# Patient Record
Sex: FEMALE | Race: BLACK OR AFRICAN AMERICAN | NOT HISPANIC OR LATINO | Employment: UNEMPLOYED | URBAN - METROPOLITAN AREA
[De-identification: names, ages, dates, MRNs, and addresses within clinical notes are randomized per-mention and may not be internally consistent; named-entity substitution may affect disease eponyms.]

---

## 2019-01-13 ENCOUNTER — HOSPITAL ENCOUNTER (EMERGENCY)
Facility: HOSPITAL | Age: 29
Discharge: HOME | End: 2019-01-13
Attending: FAMILY MEDICINE
Payer: MEDICAID

## 2019-01-13 VITALS
DIASTOLIC BLOOD PRESSURE: 101 MMHG | RESPIRATION RATE: 18 BRPM | HEART RATE: 82 BPM | BODY MASS INDEX: 26.16 KG/M2 | HEIGHT: 65 IN | OXYGEN SATURATION: 99 % | TEMPERATURE: 98 F | WEIGHT: 157 LBS | SYSTOLIC BLOOD PRESSURE: 152 MMHG

## 2019-01-13 DIAGNOSIS — H92.01 RIGHT EAR PAIN: Primary | ICD-10-CM

## 2019-01-13 DIAGNOSIS — J02.9 PHARYNGITIS, UNSPECIFIED ETIOLOGY: ICD-10-CM

## 2019-01-13 LAB — DEPRECATED S PYO AG THROAT QL EIA: NEGATIVE

## 2019-01-13 PROCEDURE — 25000003 PHARM REV CODE 250: Mod: ER | Performed by: FAMILY MEDICINE

## 2019-01-13 PROCEDURE — 87081 CULTURE SCREEN ONLY: CPT | Mod: ER

## 2019-01-13 PROCEDURE — 87147 CULTURE TYPE IMMUNOLOGIC: CPT | Mod: ER

## 2019-01-13 PROCEDURE — 99284 EMERGENCY DEPT VISIT MOD MDM: CPT | Mod: ER

## 2019-01-13 PROCEDURE — 87880 STREP A ASSAY W/OPTIC: CPT | Mod: ER

## 2019-01-13 RX ORDER — IBUPROFEN 400 MG/1
800 TABLET ORAL
Status: COMPLETED | OUTPATIENT
Start: 2019-01-13 | End: 2019-01-13

## 2019-01-13 RX ORDER — AMOXICILLIN 250 MG/1
500 CAPSULE ORAL
Status: COMPLETED | OUTPATIENT
Start: 2019-01-13 | End: 2019-01-13

## 2019-01-13 RX ORDER — ACETAMINOPHEN 325 MG/1
325 TABLET ORAL EVERY 6 HOURS PRN
COMMUNITY
End: 2019-04-24

## 2019-01-13 RX ORDER — AMOXICILLIN 500 MG/1
500 CAPSULE ORAL 3 TIMES DAILY
Qty: 21 CAPSULE | Refills: 0 | Status: SHIPPED | OUTPATIENT
Start: 2019-01-13 | End: 2019-01-20

## 2019-01-13 RX ORDER — DICLOFENAC SODIUM 50 MG/1
50 TABLET, DELAYED RELEASE ORAL 2 TIMES DAILY
Qty: 20 TABLET | Refills: 0 | Status: SHIPPED | OUTPATIENT
Start: 2019-01-13 | End: 2019-04-24

## 2019-01-13 RX ADMIN — IBUPROFEN 800 MG: 400 TABLET ORAL at 01:01

## 2019-01-13 RX ADMIN — AMOXICILLIN 500 MG: 250 CAPSULE ORAL at 01:01

## 2019-01-13 NOTE — ED PROVIDER NOTES
Encounter Date: 1/13/2019       History     Chief Complaint   Patient presents with    Otalgia     x 3 days. Started with swollen on right side now extending into right ear.      28-year-old female complains sore throat and right side of the neck pain for last 2 days.  Which slowly progressed to her right here.  Patient has been doing home remedy without any much relief so came to the ER for evaluation.  Denies fever.  No shortness of breath. Mild odynophagia but no dysphagia.  No drainage from the ER.      The history is provided by the patient.     Review of patient's allergies indicates:  No Known Allergies  Past Medical History:   Diagnosis Date    Broken leg     Kidney infection      Past Surgical History:   Procedure Laterality Date    LEG SURGERY       History reviewed. No pertinent family history.  Social History     Tobacco Use    Smoking status: Never Smoker   Substance Use Topics    Alcohol use: No     Frequency: Never    Drug use: Not on file     Review of Systems   Constitutional: Negative for appetite change, chills and fever.   HENT: Positive for ear pain and sore throat. Negative for congestion, ear discharge, facial swelling, rhinorrhea, sinus pressure and sinus pain.    Eyes: Negative for pain, redness and itching.   Respiratory: Negative for shortness of breath and wheezing.    Cardiovascular: Negative for chest pain.   Gastrointestinal: Negative for abdominal pain, diarrhea, nausea and vomiting.   Genitourinary: Negative for dysuria, flank pain and frequency.   Musculoskeletal: Negative for neck pain and neck stiffness.   Neurological: Negative for dizziness, light-headedness, numbness and headaches.   Psychiatric/Behavioral: Negative for confusion, dysphoric mood and hallucinations. The patient is not nervous/anxious.    All other systems reviewed and are negative.      Physical Exam     Initial Vitals [01/13/19 0127]   BP Pulse Resp Temp SpO2   (!) 152/101 82 18 97.8 °F (36.6 °C) 99 %       MAP       --         Physical Exam    Nursing note and vitals reviewed.  Constitutional: She appears well-developed and well-nourished.   HENT:   Head: Normocephalic.   Right Ear: There is tenderness. Tympanic membrane is injected.   Left Ear: Tympanic membrane, external ear and ear canal normal. No tenderness. Tympanic membrane is not injected.   Nose: Nose normal.   Mouth/Throat: Mucous membranes are normal. Posterior oropharyngeal erythema present.   Eyes: Conjunctivae and EOM are normal. Pupils are equal, round, and reactive to light.   Neck: Normal range of motion. Neck supple.   Cardiovascular: Normal rate, regular rhythm, normal heart sounds and intact distal pulses.   Pulmonary/Chest: Breath sounds normal. No respiratory distress. She has no wheezes. She has no rhonchi. She has no rales. She exhibits no tenderness.   Abdominal: Soft. Bowel sounds are normal. She exhibits no distension. There is no tenderness.   Musculoskeletal: Normal range of motion.   Neurological: She is alert and oriented to person, place, and time. She has normal strength and normal reflexes. She displays normal reflexes. No cranial nerve deficit. GCS score is 15. GCS eye subscore is 4. GCS verbal subscore is 5. GCS motor subscore is 6.   Skin: Skin is warm. Capillary refill takes less than 2 seconds. No rash noted. No erythema.         ED Course   Procedures  Labs Reviewed   THROAT SCREEN, RAPID          Imaging Results    None          Medical Decision Making:   Initial Assessment:   28-year-old female complains of right pharyngeal pain radiating to her right ear.  Differential Diagnosis:   Otitis, pharyngitis, tonsillitis,  Clinical Tests:   Lab Tests: Ordered and Reviewed  ED Management:  Mild erythema on the right side of the pharynx along with mild erythema the right tympanic membrane.  Right-sided cervical tenderness but no lymph nodes. Patient is started on amoxicillin and ibuprofen.  Advised to follow up primary care  physician or to the ER if symptoms worsen.                      Clinical Impression:   The primary encounter diagnosis was Right ear pain. A diagnosis of Pharyngitis, unspecified etiology was also pertinent to this visit.      Disposition:   Disposition: Discharged  Condition: Waleska Conroy MD  01/13/19 0524

## 2019-01-15 LAB — BACTERIA THROAT CULT: NORMAL

## 2019-04-02 ENCOUNTER — HOSPITAL ENCOUNTER (EMERGENCY)
Facility: HOSPITAL | Age: 29
Discharge: HOME | End: 2019-04-02
Attending: FAMILY MEDICINE
Payer: MEDICAID

## 2019-04-02 VITALS
WEIGHT: 180 LBS | TEMPERATURE: 98 F | SYSTOLIC BLOOD PRESSURE: 142 MMHG | HEART RATE: 93 BPM | DIASTOLIC BLOOD PRESSURE: 91 MMHG | OXYGEN SATURATION: 99 % | BODY MASS INDEX: 26.66 KG/M2 | RESPIRATION RATE: 16 BRPM | HEIGHT: 69 IN
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
## 2019-04-02 DIAGNOSIS — S61.309A NAIL AVULSION, FINGER, INITIAL ENCOUNTER: ICD-10-CM
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
## 2019-04-02 DIAGNOSIS — S00.03XA CONTUSION OF RIGHT TEMPOROFRONTAL SCALP, INITIAL ENCOUNTER: Primary | ICD-10-CM

## 2019-04-02 PROCEDURE — 99283 EMERGENCY DEPT VISIT LOW MDM: CPT | Mod: ER

## 2019-04-02 RX ORDER — IBUPROFEN 600 MG/1
600 TABLET ORAL EVERY 8 HOURS PRN
Qty: 21 TABLET | Refills: 0 | Status: SHIPPED | OUTPATIENT
Start: 2019-04-02 | End: 2019-04-24

## 2019-04-02 NOTE — DISCHARGE INSTRUCTIONS
Return to the ED for severe headache, changes in vision, speech or hearing, vomiting, dizziness or worse in any way.   Follow up with PCP within 2 days.
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
## 2019-04-24 ENCOUNTER — HOSPITAL ENCOUNTER (EMERGENCY)
Facility: HOSPITAL | Age: 29
Discharge: PSYCHIATRIC OR SUBSTANCE USE FACILITY | End: 2019-04-24
Attending: SURGERY
Payer: MEDICAID

## 2019-04-24 VITALS
WEIGHT: 170 LBS | OXYGEN SATURATION: 100 % | BODY MASS INDEX: 25.1 KG/M2 | TEMPERATURE: 99 F | RESPIRATION RATE: 20 BRPM | DIASTOLIC BLOOD PRESSURE: 96 MMHG | SYSTOLIC BLOOD PRESSURE: 165 MMHG | HEART RATE: 77 BPM

## 2019-04-24 DIAGNOSIS — F32.2 CURRENT SEVERE EPISODE OF MAJOR DEPRESSIVE DISORDER WITHOUT PSYCHOTIC FEATURES, UNSPECIFIED WHETHER RECURRENT: ICD-10-CM

## 2019-04-24 DIAGNOSIS — R45.851 SUICIDE IDEATION: ICD-10-CM

## 2019-04-24 DIAGNOSIS — F32.3 CURRENT SEVERE EPISODE OF MAJOR DEPRESSIVE DISORDER WITH PSYCHOTIC FEATURES WITHOUT PRIOR EPISODE: Primary | ICD-10-CM

## 2019-04-24 DIAGNOSIS — N30.00 ACUTE CYSTITIS WITHOUT HEMATURIA: ICD-10-CM

## 2019-04-24 DIAGNOSIS — R45.851 SUICIDAL IDEATIONS: ICD-10-CM

## 2019-04-24 LAB
ALBUMIN SERPL BCP-MCNC: 4.3 G/DL (ref 3.5–5.2)
ALP SERPL-CCNC: 111 U/L (ref 38–126)
ALT SERPL W/O P-5'-P-CCNC: 13 U/L (ref 10–44)
AMPHET+METHAMPHET UR QL: NEGATIVE
ANION GAP SERPL CALC-SCNC: 7 MMOL/L (ref 8–16)
APAP SERPL-MCNC: <10 UG/ML (ref 10–20)
AST SERPL-CCNC: 18 U/L (ref 15–46)
B-HCG UR QL: NEGATIVE
BACTERIA #/AREA URNS AUTO: ABNORMAL /HPF
BARBITURATES UR QL SCN>200 NG/ML: NEGATIVE
BASOPHILS # BLD AUTO: 0.01 K/UL (ref 0–0.2)
BASOPHILS NFR BLD: 0.2 % (ref 0–1.9)
BENZODIAZ UR QL SCN>200 NG/ML: NEGATIVE
BILIRUB SERPL-MCNC: 0.7 MG/DL (ref 0.1–1)
BILIRUB UR QL STRIP: NEGATIVE
BUN SERPL-MCNC: 7 MG/DL (ref 7–17)
BZE UR QL SCN: NEGATIVE
CALCIUM SERPL-MCNC: 9.3 MG/DL (ref 8.7–10.5)
CANNABINOIDS UR QL SCN: NEGATIVE
CHLORIDE SERPL-SCNC: 109 MMOL/L (ref 95–110)
CLARITY UR REFRACT.AUTO: ABNORMAL
CO2 SERPL-SCNC: 23 MMOL/L (ref 23–29)
COLOR UR AUTO: YELLOW
CREAT SERPL-MCNC: 0.66 MG/DL (ref 0.5–1.4)
CREAT UR-MCNC: 214.5 MG/DL (ref 15–325)
DIFFERENTIAL METHOD: ABNORMAL
EOSINOPHIL # BLD AUTO: 0 K/UL (ref 0–0.5)
EOSINOPHIL NFR BLD: 0.5 % (ref 0–8)
ERYTHROCYTE [DISTWIDTH] IN BLOOD BY AUTOMATED COUNT: 13.8 % (ref 11.5–14.5)
EST. GFR  (AFRICAN AMERICAN): >60 ML/MIN/1.73 M^2
EST. GFR  (NON AFRICAN AMERICAN): >60 ML/MIN/1.73 M^2
ETHANOL SERPL-MCNC: <10 MG/DL
GLUCOSE SERPL-MCNC: 111 MG/DL (ref 70–110)
GLUCOSE UR QL STRIP: NEGATIVE
HCT VFR BLD AUTO: 38.3 % (ref 37–48.5)
HGB BLD-MCNC: 12.6 G/DL (ref 12–16)
HGB UR QL STRIP: ABNORMAL
HYALINE CASTS UR QL AUTO: 0 /LPF
KETONES UR QL STRIP: NEGATIVE
LEUKOCYTE ESTERASE UR QL STRIP: ABNORMAL
LYMPHOCYTES # BLD AUTO: 2.3 K/UL (ref 1–4.8)
LYMPHOCYTES NFR BLD: 40.4 % (ref 18–48)
MCH RBC QN AUTO: 28.1 PG (ref 27–31)
MCHC RBC AUTO-ENTMCNC: 32.9 G/DL (ref 32–36)
MCV RBC AUTO: 85 FL (ref 82–98)
METHADONE UR QL SCN>300 NG/ML: NEGATIVE
MICROSCOPIC COMMENT: ABNORMAL
MONOCYTES # BLD AUTO: 0.6 K/UL (ref 0.3–1)
MONOCYTES NFR BLD: 10 % (ref 4–15)
NEUTROPHILS # BLD AUTO: 2.7 K/UL (ref 1.8–7.7)
NEUTROPHILS NFR BLD: 48.7 % (ref 38–73)
NITRITE UR QL STRIP: NEGATIVE
OPIATES UR QL SCN: NEGATIVE
PCP UR QL SCN>25 NG/ML: NEGATIVE
PH UR STRIP: 7 [PH] (ref 5–8)
PLATELET # BLD AUTO: 273 K/UL (ref 150–350)
PMV BLD AUTO: 8.6 FL (ref 9.2–12.9)
POTASSIUM SERPL-SCNC: 4 MMOL/L (ref 3.5–5.1)
PROT SERPL-MCNC: 8.2 G/DL (ref 6–8.4)
PROT UR QL STRIP: ABNORMAL
RBC # BLD AUTO: 4.49 M/UL (ref 4–5.4)
RBC #/AREA URNS AUTO: 1 /HPF (ref 0–4)
SODIUM SERPL-SCNC: 139 MMOL/L (ref 136–145)
SP GR UR STRIP: 1 (ref 1–1.03)
SQUAMOUS #/AREA URNS AUTO: 20 /HPF
TOXICOLOGY INFORMATION: NORMAL
URN SPEC COLLECT METH UR: ABNORMAL
UROBILINOGEN UR STRIP-ACNC: >=8 EU/DL
WBC # BLD AUTO: 5.59 K/UL (ref 3.9–12.7)
WBC #/AREA URNS AUTO: 25 /HPF (ref 0–5)

## 2019-04-24 PROCEDURE — 80329 ANALGESICS NON-OPIOID 1 OR 2: CPT | Mod: ER

## 2019-04-24 PROCEDURE — 87086 URINE CULTURE/COLONY COUNT: CPT | Mod: ER

## 2019-04-24 PROCEDURE — 85025 COMPLETE CBC W/AUTO DIFF WBC: CPT | Mod: ER

## 2019-04-24 PROCEDURE — 99285 EMERGENCY DEPT VISIT HI MDM: CPT | Mod: ER

## 2019-04-24 PROCEDURE — 81000 URINALYSIS NONAUTO W/SCOPE: CPT | Mod: 59,ER

## 2019-04-24 PROCEDURE — 90791 PR PSYCHIATRIC DIAGNOSTIC EVALUATION: ICD-10-PCS | Mod: GT,,, | Performed by: NURSE PRACTITIONER

## 2019-04-24 PROCEDURE — 80053 COMPREHEN METABOLIC PANEL: CPT | Mod: ER

## 2019-04-24 PROCEDURE — 25000003 PHARM REV CODE 250: Mod: ER | Performed by: SURGERY

## 2019-04-24 PROCEDURE — 81025 URINE PREGNANCY TEST: CPT | Mod: ER

## 2019-04-24 PROCEDURE — 90791 PSYCH DIAGNOSTIC EVALUATION: CPT | Mod: GT,,, | Performed by: NURSE PRACTITIONER

## 2019-04-24 PROCEDURE — 80320 DRUG SCREEN QUANTALCOHOLS: CPT | Mod: ER

## 2019-04-24 PROCEDURE — 80307 DRUG TEST PRSMV CHEM ANLYZR: CPT | Mod: ER

## 2019-04-24 PROCEDURE — 87147 CULTURE TYPE IMMUNOLOGIC: CPT | Mod: ER

## 2019-04-24 PROCEDURE — 87088 URINE BACTERIA CULTURE: CPT | Mod: ER

## 2019-04-24 RX ORDER — NITROFURANTOIN 25; 75 MG/1; MG/1
100 CAPSULE ORAL
Status: COMPLETED | OUTPATIENT
Start: 2019-04-24 | End: 2019-04-24

## 2019-04-24 RX ORDER — NITROFURANTOIN 25; 75 MG/1; MG/1
100 CAPSULE ORAL 2 TIMES DAILY
Qty: 10 CAPSULE | Refills: 0 | Status: ON HOLD | OUTPATIENT
Start: 2019-04-24 | End: 2019-04-29 | Stop reason: HOSPADM

## 2019-04-24 RX ADMIN — NITROFURANTOIN (MONOHYDRATE/MACROCRYSTALS) 100 MG: 75; 25 CAPSULE ORAL at 04:04

## 2019-04-24 NOTE — ED NOTES
Patient belongings:    1 Julián mouse bag (diapers, deodorant, perfume, lashes)  1 pink yunior pack (debit card, $20 dollars total including change, license)  1 red t shirt  1 pair of black slippers  1 pair of black pants

## 2019-04-24 NOTE — ED NOTES
CPT staff actively seeking psych placement. Faxed clinical packet to [facility], [facility], [facility] ([facility]), & [facility] ([facility]). Awaiting response at this time.
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
## 2019-04-24 NOTE — ED NOTES
Patient accepted by [facility] at [facility] ([address] [place] [address]) for the service of Dr. Helton  Report to be called to [phone].
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
## 2019-04-25 PROBLEM — F32.A DEPRESSION: Status: ACTIVE | Noted: 2019-04-25

## 2019-04-25 LAB
BACTERIA UR CULT: NORMAL

## 2019-04-29 PROBLEM — F32.A DEPRESSION: Status: RESOLVED | Noted: 2019-04-25 | Resolved: 2019-04-29

## 2021-04-28 ENCOUNTER — CLINICAL SUPPORT (OUTPATIENT)
Dept: URGENT CARE | Facility: CLINIC | Age: 31
End: 2021-04-28
Payer: MEDICAID

## 2021-04-28 DIAGNOSIS — Z11.52 ENCOUNTER FOR SCREENING LABORATORY TESTING FOR COVID-19 VIRUS: Primary | ICD-10-CM

## 2021-04-28 LAB
CTP QC/QA: YES
SARS-COV-2 RDRP RESP QL NAA+PROBE: NEGATIVE

## 2021-04-28 PROCEDURE — U0002: ICD-10-PCS | Mod: QW,S$GLB,, | Performed by: NURSE PRACTITIONER

## 2021-04-28 PROCEDURE — U0002 COVID-19 LAB TEST NON-CDC: HCPCS | Mod: QW,S$GLB,, | Performed by: NURSE PRACTITIONER
